# Patient Record
Sex: MALE | Race: WHITE | Employment: FULL TIME
[De-identification: names, ages, dates, MRNs, and addresses within clinical notes are randomized per-mention and may not be internally consistent; named-entity substitution may affect disease eponyms.]

---

## 2023-09-07 ENCOUNTER — HOSPITAL ENCOUNTER (OUTPATIENT)
Facility: HOSPITAL | Age: 48
Setting detail: RECURRING SERIES
Discharge: HOME | End: 2023-09-10
Payer: COMMERCIAL

## 2023-09-07 PROCEDURE — 97161 PT EVAL LOW COMPLEX 20 MIN: CPT

## 2023-09-13 ENCOUNTER — HOSPITAL ENCOUNTER (OUTPATIENT)
Facility: HOSPITAL | Age: 48
Setting detail: RECURRING SERIES
Discharge: HOME | End: 2023-09-16
Payer: COMMERCIAL

## 2023-09-13 PROCEDURE — 97110 THERAPEUTIC EXERCISES: CPT

## 2023-09-13 PROCEDURE — 97530 THERAPEUTIC ACTIVITIES: CPT

## 2023-09-13 PROCEDURE — 97112 NEUROMUSCULAR REEDUCATION: CPT

## 2023-09-13 NOTE — PROGRESS NOTES
PHYSICAL / OCCUPATIONAL THERAPY - DAILY TREATMENT NOTE (updated )    Patient Name: Elsa Knenedy    Date: 2023    : 1975  Insurance: Payor: Thelma Bradford / Plan: OPTIMA PPO / Product Type: *No Product type* /      Patient  verified Yes     Visit #   Current / Total 2 12   Time   In / Out 0700 0730   Pain   In / Out 0 0   Subjective Functional Status/Changes: Pt pleasant reports he was able to play tennis this past weekend with his dtr with great results and no increased pain. Changes to: Allergies, Med Hx, Sx Hx?   no       TREATMENT AREA =  Cervicalgia [M54.2]    OBJECTIVE    Therapeutic Procedures: Tx Min Billable or 1:1 Min (if diff from Tx Min) Procedure, Rationale, Specifics   10  44572 Therapeutic Exercise (timed):  increase ROM, strength, coordination, balance, and proprioception to improve patient's ability to progress to PLOF and address remaining functional goals. (see flow sheet as applicable)    Details if applicable:       10  06974 Neuromuscular Re-Education (timed):  improve balance, coordination, kinesthetic sense, posture, core stability and proprioception to improve patient's ability to develop conscious control of individual muscles and awareness of position of extremities in order to progress to PLOF and address remaining functional goals. (see flow sheet as applicable)    Details if applicable:     10  72357 Therapeutic Activity (timed):  use of dynamic activities replicating functional movements to increase ROM, strength, coordination, balance, and proprioception in order to improve patient's ability to progress to PLOF and address remaining functional goals.   (see flow sheet as applicable)     Details if applicable:           Details if applicable:            Details if applicable:     27  [facility] Totals Reminder: bill using total billable min of TIMED therapeutic procedures (example: do not include dry needle or estim unattended, both untimed codes, in totals to left)  8-22 min 50

## 2023-09-22 ENCOUNTER — HOSPITAL ENCOUNTER (OUTPATIENT)
Facility: HOSPITAL | Age: 48
Setting detail: RECURRING SERIES
Discharge: HOME | End: 2023-09-25
Payer: COMMERCIAL

## 2023-09-22 PROCEDURE — 97530 THERAPEUTIC ACTIVITIES: CPT

## 2023-09-22 PROCEDURE — 97112 NEUROMUSCULAR REEDUCATION: CPT

## 2023-09-22 PROCEDURE — 97110 THERAPEUTIC EXERCISES: CPT

## 2023-09-22 NOTE — PROGRESS NOTES
PHYSICAL / OCCUPATIONAL THERAPY - DAILY TREATMENT NOTE (updated )    Patient Name: Norah Passer    Date: 2023    : 1975  Insurance: Payor: Cresencio Magaña / Plan: OPTIMA PPO / Product Type: *No Product type* /      Patient  verified Yes     Visit #   Current / Total 3 12   Time   In / Out 1718 0715   Pain   In / Out 4 0   Subjective Functional Status/Changes: Pt pleasant, reports he had increased symptoms starting over the weekend and early this week with nerve pain in left side cervical paraspinals and bicep \"numbness and feeling of weakness but no noted strength loss. \"   Changes to: Allergies, Med Hx, Sx Hx?   no       TREATMENT AREA =  Cervicalgia [M54.2]    OBJECTIVE  Therapeutic Procedures: Tx Min Billable or 1:1 Min (if diff from Tx Min) Procedure, Rationale, Specifics   10  13806 Therapeutic Exercise (timed):  increase ROM, strength, coordination, balance, and proprioception to improve patient's ability to progress to PLOF and address remaining functional goals. (see flow sheet as applicable)    Details if applicable:       10  72785 Neuromuscular Re-Education (timed):  improve balance, coordination, kinesthetic sense, posture, core stability and proprioception to improve patient's ability to develop conscious control of individual muscles and awareness of position of extremities in order to progress to PLOF and address remaining functional goals. (see flow sheet as applicable)    Details if applicable:      Therapeutic Activity (timed):  use of dynamic activities replicating functional movements to increase ROM, strength, coordination, balance, and proprioception in order to improve patient's ability to progress to PLOF and address remaining functional goals.   (see flow sheet as applicable)     Details if applicable:           Details if applicable:            Details if applicable:     28  [facility] Totals Reminder: bill using total billable min of TIMED therapeutic procedures (example: do

## 2023-09-28 ENCOUNTER — HOSPITAL ENCOUNTER (OUTPATIENT)
Facility: HOSPITAL | Age: 48
Setting detail: RECURRING SERIES
End: 2023-09-28
Payer: COMMERCIAL

## 2023-09-28 PROCEDURE — 97112 NEUROMUSCULAR REEDUCATION: CPT

## 2023-09-28 PROCEDURE — 97530 THERAPEUTIC ACTIVITIES: CPT

## 2023-09-28 PROCEDURE — 97110 THERAPEUTIC EXERCISES: CPT

## 2023-09-28 PROCEDURE — 97535 SELF CARE MNGMENT TRAINING: CPT

## 2023-09-28 NOTE — PROGRESS NOTES
PHYSICAL / OCCUPATIONAL THERAPY - DAILY TREATMENT NOTE (updated )    Patient Name: Helga Griffin    Date: 2023    : 1975  Insurance: Payor: Enrique Luther / Plan: OPTIMA PPO / Product Type: *No Product type* /      Patient  verified Yes     Visit #   Current / Total 4 12   Time   In / Out 0640 0715   Pain   In / Out 0 0   Subjective Functional Status/Changes: Pt reports signficiant improvement in symptoms from last visit. He reports he has not had any of the weakness or pain in his bicep and he reports he is able to resolve the neck pain with postural correction. Changes to: Allergies, Med Hx, Sx Hx?   no       TREATMENT AREA =  Cervicalgia [M54.2]    OBJECTIVE    Therapeutic Procedures: Tx Min Billable or 1:1 Min (if diff from Tx Min) Procedure, Rationale, Specifics   8  05150 Therapeutic Exercise (timed):  increase ROM, strength, coordination, balance, and proprioception to improve patient's ability to progress to PLOF and address remaining functional goals. (see flow sheet as applicable)    Details if applicable:       9  48326 Neuromuscular Re-Education (timed):  improve balance, coordination, kinesthetic sense, posture, core stability and proprioception to improve patient's ability to develop conscious control of individual muscles and awareness of position of extremities in order to progress to PLOF and address remaining functional goals. (see flow sheet as applicable)    Details if applicable:     10  48905 Therapeutic Activity (timed):  use of dynamic activities replicating functional movements to increase ROM, strength, coordination, balance, and proprioception in order to improve patient's ability to progress to PLOF and address remaining functional goals.   (see flow sheet as applicable)     Details if applicable:     8  99439 Self Care/Home Management (timed):  improve patient knowledge and understanding of activity modification and physical therapy expectations, procedures and progression UE to 3/10 at worst to improve work duty tolerance and restore prior level of function. Eval Status: 6/10 at worst  Current: reports 4/10 today and over the weekend 9/22/23     Pt will have painfree pectoralis flexibility and upper trap WFL to aid in functional mechanics for improved posture. Eval Status: tightness noted in pectoralis and upper trap muscles bilaterally inhibiting postural restoration  Current: significant improvement noted as able to complete full ROM with PNF flexion pattern 9/28/23     Long Term Goals: To be accomplished in 12 treatments:  Patient will improve FOTO score by 10 points to improve functional tolerance for work duty performance. Eval Status: FOTO 79  FOTO score = an established functional score where 100 = no disability     Pt will have 5/5 bilateral UE and scapular stability strength to return to goals of performing procedures without increased pain. Eval Status: Lower Trap: 4-        Rhomboids: 4-        Middle Trap: 4-        Serratus Ant: 4-        Ext Rotators: 4                      Shoulder Left (1-5) Right (1-5)   Shoulder Flexion 4+ 4+   Shoulder Extension 4 4   Shoulder ABD 4+ 4+   Shoulder ADD 4 4   Shoulder IR 4 4+   Shoulder ER 4 4+                                             Cervical Left (1-5) Right (1-5)   Flexion 4     Extension 4     Side Bend 4 4   Rotation 4 4         Patient will improve pain in neck and left UE to 0/10 at worst to improve ADL tolerance and restore prior level of function.   Eval Status: 6/10 at worst      PLAN  Yes  Continue plan of care  []  Upgrade activities as tolerated  []  Discharge due to :  []  Other:    Isabell Oliveira PT    9/28/2023    7:22 AM    Future Appointments   Date Time Provider [address] [place] [address]   10/4/2023  7:10 AM Isabell Oliveira PT [facility]

## 2023-10-04 ENCOUNTER — HOSPITAL ENCOUNTER (OUTPATIENT)
Facility: HOSPITAL | Age: 48
Setting detail: RECURRING SERIES
Discharge: HOME | End: 2023-10-07
Payer: COMMERCIAL

## 2023-10-04 PROCEDURE — 97530 THERAPEUTIC ACTIVITIES: CPT

## 2023-10-04 PROCEDURE — 97110 THERAPEUTIC EXERCISES: CPT

## 2023-10-04 PROCEDURE — 97535 SELF CARE MNGMENT TRAINING: CPT

## 2023-10-04 PROCEDURE — 97112 NEUROMUSCULAR REEDUCATION: CPT

## 2023-10-04 NOTE — PROGRESS NOTES
PHYSICAL / OCCUPATIONAL THERAPY - DAILY TREATMENT NOTE (updated )    Patient Name: Terry Rivera    Date: 10/4/2023    : 1975  Insurance: Payor: David Rodriguez / Plan: OPTIMA PPO / Product Type: *No Product type* /      Patient  verified Yes     Visit #   Current / Total 5 12   Time   In / Out 0700 0740   Pain   In / Out 2 1   Subjective Functional Status/Changes: Pt reports he has had overall improved symptoms however reports that he has not had optimal work environment to improve overall function since last week he has only gotten 3 hrs of sleep on three different occasions. He did state he is still able to complete    Changes to: Allergies, Med Hx, Sx Hx?   no       TREATMENT AREA =  Cervicalgia [M54.2]    OBJECTIVE    Therapeutic Procedures: Tx Min Billable or 1:1 Min (if diff from Tx Min) Procedure, Rationale, Specifics   10  40219 Therapeutic Exercise (timed):  increase ROM, strength, coordination, balance, and proprioception to improve patient's ability to progress to PLOF and address remaining functional goals. (see flow sheet as applicable)    Details if applicable:       10  40585 Neuromuscular Re-Education (timed):  improve balance, coordination, kinesthetic sense, posture, core stability and proprioception to improve patient's ability to develop conscious control of individual muscles and awareness of position of extremities in order to progress to PLOF and address remaining functional goals. (see flow sheet as applicable)    Details if applicable:     10  68130 Therapeutic Activity (timed):  use of dynamic activities replicating functional movements to increase ROM, strength, coordination, balance, and proprioception in order to improve patient's ability to progress to PLOF and address remaining functional goals.   (see flow sheet as applicable)     Details if applicable:     10  06156 Self Care/Home Management (timed):  improve patient knowledge and understanding of posture/ergonomics, home

## 2023-10-13 ENCOUNTER — HOSPITAL ENCOUNTER (OUTPATIENT)
Facility: HOSPITAL | Age: 48
Setting detail: RECURRING SERIES
Discharge: HOME | End: 2023-10-16
Payer: COMMERCIAL

## 2023-10-13 PROCEDURE — 97530 THERAPEUTIC ACTIVITIES: CPT

## 2023-10-13 PROCEDURE — 97112 NEUROMUSCULAR REEDUCATION: CPT

## 2023-10-13 PROCEDURE — 97110 THERAPEUTIC EXERCISES: CPT

## 2023-10-13 NOTE — PROGRESS NOTES
for improved posture. Eval Status: tightness noted in pectoralis and upper trap muscles bilaterally inhibiting postural restoration  Current: significant improvement noted as able to complete full ROM with PNF flexion pattern 9/28/23     Long Term Goals: To be accomplished in 12 treatments:  Patient will improve FOTO score by 10 points to improve functional tolerance for work duty performance. Eval Status: FOTO 79  FOTO score = an established functional score where 100 = no disability     Pt will have 5/5 bilateral UE and scapular stability strength to return to goals of performing procedures without increased pain. Eval Status: Lower Trap: 4-        Rhomboids: 4-        Middle Trap: 4-        Serratus Ant: 4-        Ext Rotators: 4                      Shoulder Left (1-5) Right (1-5)   Shoulder Flexion 4+ 4+   Shoulder Extension 4 4   Shoulder ABD 4+ 4+   Shoulder ADD 4 4   Shoulder IR 4 4+   Shoulder ER 4 4+                                             Cervical Left (1-5) Right (1-5)   Flexion 4     Extension 4     Side Bend 4 4   Rotation 4 4         Patient will improve pain in neck and left UE to 0/10 at worst to improve ADL tolerance and restore prior level of function.   Eval Status: 6/10 at worst  Current: reports 2/10 at start of session today with bulk of bicep pain dissipated however the neck/cervical paraspinal pain has not completely resolved 10/4/23      PLAN  Yes  Continue plan of care  []  Upgrade activities as tolerated  []  Discharge due to :  []  Other:    Margot Moses PT    10/13/2023    7:06 AM    Future Appointments   Date Time Provider [address] [place] [address]   10/13/2023  7:10 AM Margot Moses PT [facility]   10/18/2023  7:10 AM Britney Fagan, PT [facility]

## 2023-10-18 ENCOUNTER — HOSPITAL ENCOUNTER (OUTPATIENT)
Facility: HOSPITAL | Age: 48
Setting detail: RECURRING SERIES
Discharge: HOME | End: 2023-10-21
Payer: COMMERCIAL

## 2023-10-18 PROCEDURE — 97112 NEUROMUSCULAR REEDUCATION: CPT

## 2023-10-18 PROCEDURE — 97110 THERAPEUTIC EXERCISES: CPT

## 2023-10-18 PROCEDURE — 97530 THERAPEUTIC ACTIVITIES: CPT

## 2023-10-18 NOTE — PROGRESS NOTES
[facility] [address] [place], [address] [facility]  Ph [phone]  Fx [phone]    Physical Therapy Progress Note  Patient name: Flavio Santos Start of Care: 2023   Referral source: Lenny Maldonado,* : 1975               Medical Diagnosis: Cervicalgia [M54.2]    Onset Date:23               Treatment Diagnosis: M54.2  NECK PAIN     Prior Hospitalization: see medical history Provider#: [id number]   Medications: Verified on Patient summary List   Comorbidities: celiac; NA otherwise  Prior Level of Function: functionally independent, no AD, active lifestyle      Visits from Start of Care: 7    Missed Visits: 0    Updated Goals/Measure of Progress: To be achieved in 5 treatments:    Short Term Goals: To be accomplished in 4 treatments:  Patient will be independent and compliant with HEP to progress toward goals and restore functional mobility. Eval Status: issued at eval  10/18/23 PN: pt completes HEP daily met     Patient will improve pain in neck and left UE to 3/10 at worst to improve work duty tolerance and restore prior level of function. Eval Status: 6/10 at worst  10/18/23 PN: pt reports 0/10 today and he was able to play tennis with increase to 1/10  met     Pt will have painfree pectoralis flexibility and upper trap WFL to aid in functional mechanics for improved posture. Eval Status: tightness noted in pectoralis and upper trap muscles bilaterally inhibiting postural restoration  10/18/23 PN: pt able to achieve upright and neutral posture but is unable to maintain posture secondary to weak scapular stability musculature      Long Term Goals: To be accomplished in 12 treatments:  Patient will improve FOTO score by 10 points to improve functional tolerance for work duty performance.   Eval Status: FOTO 79  FOTO score = an established functional score where 100 = no disability      Current: will address at next visit     Pt will have 5/5 bilateral UE and
left)  8-22 min = 1 unit; 23-37 min = 2 units; 38-52 min = 3 units; 53-67 min = 4 units; 68-82 min = 5 units   Total Total     TOTAL TREATMENT TIME:        40     [x]  Patient Education billed concurrently with other procedures   [x] Review HEP    [] Progressed/Changed HEP, detail:    [] Other detail:       Objective Information/Functional Measures/Assessment    Patient has attended 7 PT visits thus far with good progress toward goals. He demonstrates improved strength, flexibility and pain levels since [facility] however still demonstrates decreased scapular stability strength and endurance. tolerated treatment session well today. Patient had no complaints with addition of deep squat with thoracic rotation, spiderman with thoracic rotation and foam roll thoracic extension to exercise program to accomplish improved thoracic flexibility and ability to get to cervical end range. Patient continues to make good progress toward goals and would benefit from continued skilled PT intervention to address remaining deficits outlined in goals below. Patient will continue to benefit from skilled PT / OT services to modify and progress therapeutic interventions, analyze and address functional mobility deficits, analyze and address ROM deficits, analyze and address strength deficits, analyze and address soft tissue restrictions, analyze and cue for proper movement patterns, and analyze and modify for postural abnormalities to address functional deficits and attain remaining goals. Progress toward goals / Updated goals:  [x]  See Progress Note/Recertification    Short Term Goals: To be accomplished in 4 treatments:  Patient will be independent and compliant with HEP to progress toward goals and restore functional mobility.    Eval Status: issued at eval  10/18/23 PN: pt completes HEP daily met     Patient will improve pain in neck and left UE to 3/10 at worst to improve work duty tolerance and restore prior level of

## 2023-10-23 ENCOUNTER — HOSPITAL ENCOUNTER (OUTPATIENT)
Facility: HOSPITAL | Age: 48
Setting detail: RECURRING SERIES
Discharge: HOME | End: 2023-10-26
Payer: COMMERCIAL

## 2023-10-23 PROCEDURE — 97112 NEUROMUSCULAR REEDUCATION: CPT

## 2023-10-23 PROCEDURE — 97110 THERAPEUTIC EXERCISES: CPT

## 2023-10-23 PROCEDURE — 97530 THERAPEUTIC ACTIVITIES: CPT

## 2023-10-23 NOTE — PROGRESS NOTES
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   Reports that he hasn't had any falls or near falls Standing: reaching towards 8\" step on floor for cones and stacking at waist height x6            Charges: Nreed 3       Total Timed Treatment: 45 min  Total Treatment Time: 45 min mechanics for improved posture. Eval Status: tightness noted in pectoralis and upper trap muscles bilaterally inhibiting postural restoration  10/18/23 PN: pt able to achieve upright and neutral posture but is unable to maintain posture secondary to weak scapular stability musculature      Long Term Goals: To be accomplished in 12 treatments:  Patient will improve FOTO score by 10 points to improve functional tolerance for work duty performance. Eval Status: FOTO 79  FOTO score = an established functional score where 100 = no disability      Current: will address at next visit     Pt will have 5/5 bilateral UE and scapular stability strength to return to goals of performing procedures without increased pain. Eval Status: Lower Trap: 4-        Rhomboids: 4-        Middle Trap: 4-        Serratus Ant: 4-        Ext Rotators: 4                      Shoulder Left (1-5) Right (1-5)   Shoulder Flexion 4+ 4+   Shoulder Extension 4 4   Shoulder ABD 4+ 4+   Shoulder ADD 4 4   Shoulder IR 4 4+   Shoulder ER 4 4+                                             Cervical Left (1-5) Right (1-5)   Flexion 4     Extension 4     Side Bend 4 4   Rotation 4 4    10/18/23 PN: : Lower Trap: 4        Rhomboids: 4        Middle Trap: 4        Serratus Ant: 4        Ext Rotators: 4+                      Shoulder Left (1-5) Right (1-5)   Shoulder Flexion 4+ 4+   Shoulder Extension 4 4   Shoulder ABD 4+ 4+   Shoulder ADD 4 4   Shoulder IR 4 4+   Shoulder ER 4 4+                                             Cervical Left (1-5) Right (1-5)   Flexion 4+     Extension 4+     Side Bend 4+ 4+   Rotation 4+ 4+         Patient will improve pain in neck and left UE to 0/10 at worst to improve ADL tolerance and restore prior level of function.   Eval Status: 6/10 at worst  10/18/23 PN: pt reports 0/10 today and he was able to play tennis with increase to 1/10  progressing  Current: pt reports increased pain intensity on Weds and Thurs but resolution of

## 2023-11-08 ENCOUNTER — HOSPITAL ENCOUNTER (OUTPATIENT)
Facility: HOSPITAL | Age: 48
Setting detail: RECURRING SERIES
Discharge: HOME | End: 2023-11-11
Payer: COMMERCIAL

## 2023-11-08 PROCEDURE — 97140 MANUAL THERAPY 1/> REGIONS: CPT

## 2023-11-08 PROCEDURE — 97110 THERAPEUTIC EXERCISES: CPT

## 2023-11-08 PROCEDURE — 97112 NEUROMUSCULAR REEDUCATION: CPT

## 2023-11-08 PROCEDURE — 97530 THERAPEUTIC ACTIVITIES: CPT

## 2023-11-08 NOTE — PROGRESS NOTES
separate and distinct time from the therapeutic activities interventions . Details: grade I P-A mobilization at C6, C7 and T1 to improve mobility and flexibility of interspinous ligament    Details if applicable:            Details if applicable:     45  [facility] Totals Reminder: bill using total billable min of TIMED therapeutic procedures (example: do not include dry needle or estim unattended, both untimed codes, in totals to left)  8-22 min = 1 unit; 23-37 min = 2 units; 38-52 min = 3 units; 53-67 min = 4 units; 68-82 min = 5 units   Total Total     TOTAL TREATMENT TIME:        38     [x]  Patient Education billed concurrently with other procedures   [x] Review HEP    [] Progressed/Changed HEP, detail:    [] Other detail:       Objective Information/Functional Measures/Assessment    Patient tolerated treatment session well today. Patient had no complaints with addition of standing Y, T, I and manual therapy to exercise program to accomplish improved pain levels and ability to achieve good posture during exercise performance. Patient continues to make good progress toward goals and would benefit from continued skilled PT intervention to address remaining deficits outlined in goals below. Patient will continue to benefit from skilled PT / OT services to modify and progress therapeutic interventions, analyze and address functional mobility deficits, analyze and address ROM deficits, analyze and address strength deficits, analyze and address soft tissue restrictions, analyze and cue for proper movement patterns, and analyze and modify for postural abnormalities to address functional deficits and attain remaining goals. Progress toward goals / Updated goals:  []  See Progress Note/Recertification    Short Term Goals: To be accomplished in 4 treatments:  Patient will be independent and compliant with HEP to progress toward goals and restore functional mobility.    Eval Status: issued at eval  10/18/23 PN: pt

## 2023-11-27 ENCOUNTER — HOSPITAL ENCOUNTER (OUTPATIENT)
Facility: HOSPITAL | Age: 48
Setting detail: RECURRING SERIES
Discharge: HOME | End: 2023-11-30
Payer: COMMERCIAL

## 2023-11-27 PROCEDURE — 97535 SELF CARE MNGMENT TRAINING: CPT

## 2023-11-27 PROCEDURE — 97110 THERAPEUTIC EXERCISES: CPT

## 2023-11-27 PROCEDURE — 97530 THERAPEUTIC ACTIVITIES: CPT

## 2023-11-27 NOTE — PROGRESS NOTES
is painfree today and reports playing tennis and pingpong with no increased pain met            Summary of Care/ Key Functional Changes:    Patient has attended 10 PT visits with great progress toward goals however still has strength and endurance deficits that could be further addressed with PT treatment. He tolerated treatment session well today. Patient had no complaints with addition of compound exercises with focus on maintenance of correct cervical posture throughout exercise rep performance. Patient continues to make good progress toward goals and would benefit from continued skilled PT intervention to address remaining deficits outlined in goals below. Patient will continue to benefit from skilled PT / OT services to modify and progress therapeutic interventions, analyze and address functional mobility deficits, analyze and address ROM deficits, analyze and address strength deficits, analyze and address soft tissue restrictions, analyze and cue for proper movement patterns, and analyze and modify for postural abnormalities to address functional deficits and attain remaining goals. ASSESSMENT/RECOMMENDATIONS:    Continue per plan of care.      Thank you for this referral.   Kaelyn Paredes, PT 11/27/2023 11:40 AM
Serratus Ant: 4+        Ext Rotators: 4+                      Shoulder Left (1-5) Right (1-5)   Shoulder Flexion 5 5   Shoulder Extension 5 5   Shoulder ABD 5 5   Shoulder ADD 4+ 4+   Shoulder IR 4+ 4+   Shoulder ER 4+ 4+                                             Cervical Left (1-5) Right (1-5)   Flexion 5     Extension 5     Side Bend 4+ 4+   Rotation 4+ 4+     Patient will improve pain in neck and left UE to 0/10 at worst to improve ADL tolerance and restore prior level of function. Eval Status: 6/10 at worst  10/18/23 PN: pt reports 0/10 today and he was able to play tennis with increase to 1/10  progressing  11/27: pt is painfree today and reports playing tennis and pingpong with no increased pain met2      PLAN  Yes  Continue plan of care  []  Upgrade activities as tolerated  []  Discharge due to :  []  Other:    Elliott Palacios, PT    11/27/2023    11:21 AM    No future appointments.